# Patient Record
Sex: FEMALE | Race: WHITE | NOT HISPANIC OR LATINO | Employment: OTHER | URBAN - METROPOLITAN AREA
[De-identification: names, ages, dates, MRNs, and addresses within clinical notes are randomized per-mention and may not be internally consistent; named-entity substitution may affect disease eponyms.]

---

## 2023-09-09 PROBLEM — R55 VASOVAGAL SYNCOPE: Status: ACTIVE | Noted: 2023-09-09

## 2023-09-09 PROBLEM — Z86.69 HX OF GUILLAIN-BARRE SYNDROME: Status: ACTIVE | Noted: 2023-09-09

## 2023-09-09 PROBLEM — E78.5 HYPERLIPIDEMIA: Status: ACTIVE | Noted: 2023-09-09

## 2023-09-09 PROBLEM — J40 BRONCHITIS: Status: ACTIVE | Noted: 2023-09-09

## 2023-09-09 PROBLEM — K21.9 ACID REFLUX: Status: ACTIVE | Noted: 2023-09-09

## 2023-09-09 PROBLEM — E03.9 HYPOTHYROIDISM: Status: ACTIVE | Noted: 2023-09-09

## 2023-09-09 PROBLEM — N95.1 MENOPAUSAL SYMPTOM: Status: ACTIVE | Noted: 2023-09-09

## 2023-09-09 PROBLEM — M25.511 SHOULDER PAIN, RIGHT: Status: ACTIVE | Noted: 2023-09-09

## 2023-09-09 PROBLEM — L20.9 ATOPIC DERMATITIS: Status: ACTIVE | Noted: 2023-09-09

## 2023-09-09 PROBLEM — R35.0 URINE FREQUENCY: Status: ACTIVE | Noted: 2023-09-09

## 2023-09-09 PROBLEM — R82.998 LEUKOCYTES IN URINE: Status: ACTIVE | Noted: 2023-09-09

## 2023-09-09 PROBLEM — G43.909 MIGRAINE: Status: ACTIVE | Noted: 2023-09-09

## 2023-09-09 PROBLEM — M43.6 TORTICOLLIS: Status: ACTIVE | Noted: 2023-09-09

## 2023-09-09 PROBLEM — Z86.018 HISTORY OF PITUITARY ADENOMA: Status: ACTIVE | Noted: 2023-09-09

## 2023-09-09 PROBLEM — M19.90 OSTEOARTHRITIS: Status: ACTIVE | Noted: 2023-09-09

## 2023-09-09 PROBLEM — K64.9 HEMORRHOIDS: Status: ACTIVE | Noted: 2023-09-09

## 2023-09-09 PROBLEM — R73.09 ELEVATED GLUCOSE: Status: ACTIVE | Noted: 2023-09-09

## 2023-09-09 RX ORDER — BETAMETHASONE DIPROPIONATE 0.5 MG/G
CREAM TOPICAL
COMMUNITY
Start: 2014-10-22

## 2023-09-09 RX ORDER — LEVOTHYROXINE SODIUM 112 UG/1
TABLET ORAL
COMMUNITY
Start: 2017-07-28 | End: 2024-04-09 | Stop reason: ALTCHOICE

## 2023-09-09 RX ORDER — BENZONATATE 100 MG/1
CAPSULE ORAL
COMMUNITY
Start: 2021-11-01 | End: 2024-04-09 | Stop reason: WASHOUT

## 2023-09-09 RX ORDER — OMEPRAZOLE 20 MG/1
CAPSULE, DELAYED RELEASE ORAL
COMMUNITY

## 2023-09-09 RX ORDER — LEVOTHYROXINE SODIUM 125 UG/1
TABLET ORAL
COMMUNITY
Start: 2021-11-21 | End: 2024-04-10 | Stop reason: SDUPTHER

## 2023-09-18 ENCOUNTER — HOSPITAL ENCOUNTER (OUTPATIENT)
Dept: DATA CONVERSION | Facility: HOSPITAL | Age: 73
Discharge: HOME | End: 2023-09-18
Payer: MEDICARE

## 2023-09-18 DIAGNOSIS — M48.062 SPINAL STENOSIS, LUMBAR REGION WITH NEUROGENIC CLAUDICATION: ICD-10-CM

## 2023-10-02 ENCOUNTER — HOSPITAL ENCOUNTER (OUTPATIENT)
Dept: RADIOLOGY | Facility: EXTERNAL LOCATION | Age: 73
Discharge: HOME | End: 2023-10-02
Payer: MEDICARE

## 2023-10-02 DIAGNOSIS — M19.071 ARTHRITIS OF RIGHT ANKLE: ICD-10-CM

## 2024-04-09 ENCOUNTER — OFFICE VISIT (OUTPATIENT)
Dept: PRIMARY CARE | Facility: CLINIC | Age: 74
End: 2024-04-09
Payer: MEDICARE

## 2024-04-09 ENCOUNTER — LAB (OUTPATIENT)
Dept: LAB | Facility: LAB | Age: 74
End: 2024-04-09
Payer: MEDICARE

## 2024-04-09 VITALS
BODY MASS INDEX: 35.44 KG/M2 | HEIGHT: 63 IN | WEIGHT: 200 LBS | TEMPERATURE: 96.4 F | OXYGEN SATURATION: 94 % | DIASTOLIC BLOOD PRESSURE: 88 MMHG | SYSTOLIC BLOOD PRESSURE: 138 MMHG | HEART RATE: 66 BPM

## 2024-04-09 DIAGNOSIS — Z01.89 ENCOUNTER FOR ROUTINE LABORATORY TESTING: ICD-10-CM

## 2024-04-09 DIAGNOSIS — Z00.00 ENCOUNTER FOR MEDICARE ANNUAL WELLNESS EXAM: Primary | ICD-10-CM

## 2024-04-09 DIAGNOSIS — K21.9 GASTROESOPHAGEAL REFLUX DISEASE WITHOUT ESOPHAGITIS: ICD-10-CM

## 2024-04-09 DIAGNOSIS — E03.9 ACQUIRED HYPOTHYROIDISM: ICD-10-CM

## 2024-04-09 DIAGNOSIS — R53.83 FATIGUE, UNSPECIFIED TYPE: ICD-10-CM

## 2024-04-09 DIAGNOSIS — Z78.0 MENOPAUSE: ICD-10-CM

## 2024-04-09 DIAGNOSIS — R73.09 ELEVATED GLUCOSE: ICD-10-CM

## 2024-04-09 DIAGNOSIS — Z11.59 NEED FOR HEPATITIS C SCREENING TEST: ICD-10-CM

## 2024-04-09 DIAGNOSIS — E78.2 MIXED HYPERLIPIDEMIA: ICD-10-CM

## 2024-04-09 DIAGNOSIS — Z13.820 ENCOUNTER FOR SCREENING FOR OSTEOPOROSIS: ICD-10-CM

## 2024-04-09 PROBLEM — M48.061 SPINAL STENOSIS OF LUMBAR REGION: Status: ACTIVE | Noted: 2023-09-14

## 2024-04-09 PROBLEM — T14.8XXA CONTUSION: Status: RESOLVED | Noted: 2024-04-09 | Resolved: 2024-04-09

## 2024-04-09 PROBLEM — E66.9 OBESITY: Status: ACTIVE | Noted: 2024-04-09

## 2024-04-09 PROBLEM — M16.12 UNILATERAL PRIMARY OSTEOARTHRITIS, LEFT HIP: Status: ACTIVE | Noted: 2024-04-09

## 2024-04-09 PROBLEM — M47.812 CERVICAL SPONDYLOSIS WITHOUT MYELOPATHY: Status: ACTIVE | Noted: 2023-04-17

## 2024-04-09 PROBLEM — R05.9 COUGH: Status: RESOLVED | Noted: 2023-05-12 | Resolved: 2024-04-09

## 2024-04-09 PROBLEM — N39.0 ACUTE URINARY TRACT INFECTION: Status: RESOLVED | Noted: 2023-05-12 | Resolved: 2024-04-09

## 2024-04-09 PROBLEM — G61.0: Status: RESOLVED | Noted: 2024-04-09 | Resolved: 2024-04-09

## 2024-04-09 PROBLEM — K85.90 PANCREATITIS (HHS-HCC): Status: RESOLVED | Noted: 2024-04-09 | Resolved: 2024-04-09

## 2024-04-09 PROBLEM — M54.12 CERVICAL RADICULOPATHY: Status: ACTIVE | Noted: 2023-04-07

## 2024-04-09 PROBLEM — M19.90 ARTHRITIS: Status: ACTIVE | Noted: 2024-04-09

## 2024-04-09 PROBLEM — H66.90 ACUTE OTITIS MEDIA: Status: RESOLVED | Noted: 2024-04-09 | Resolved: 2024-04-09

## 2024-04-09 PROBLEM — K25.9 GASTRIC ULCER: Status: RESOLVED | Noted: 2024-04-09 | Resolved: 2024-04-09

## 2024-04-09 LAB
ALBUMIN SERPL-MCNC: 4.5 G/DL (ref 3.5–5)
ALP BLD-CCNC: 110 U/L (ref 35–125)
ALT SERPL-CCNC: 9 U/L (ref 5–40)
ANION GAP SERPL CALC-SCNC: 15 MMOL/L
AST SERPL-CCNC: 17 U/L (ref 5–40)
BASOPHILS # BLD AUTO: 0.05 X10*3/UL (ref 0–0.1)
BASOPHILS NFR BLD AUTO: 0.5 %
BILIRUB SERPL-MCNC: 0.5 MG/DL (ref 0.1–1.2)
BUN SERPL-MCNC: 18 MG/DL (ref 8–25)
CALCIUM SERPL-MCNC: 10.1 MG/DL (ref 8.5–10.4)
CHLORIDE SERPL-SCNC: 103 MMOL/L (ref 97–107)
CHOLEST SERPL-MCNC: 236 MG/DL (ref 133–200)
CHOLEST/HDLC SERPL: 5 {RATIO}
CO2 SERPL-SCNC: 25 MMOL/L (ref 24–31)
CREAT SERPL-MCNC: 0.9 MG/DL (ref 0.4–1.6)
EGFRCR SERPLBLD CKD-EPI 2021: 67 ML/MIN/1.73M*2
EOSINOPHIL # BLD AUTO: 0.32 X10*3/UL (ref 0–0.4)
EOSINOPHIL NFR BLD AUTO: 3 %
ERYTHROCYTE [DISTWIDTH] IN BLOOD BY AUTOMATED COUNT: 14.2 % (ref 11.5–14.5)
EST. AVERAGE GLUCOSE BLD GHB EST-MCNC: 131 MG/DL
GLUCOSE SERPL-MCNC: 97 MG/DL (ref 65–99)
HBA1C MFR BLD: 6.2 %
HCT VFR BLD AUTO: 42.9 % (ref 36–46)
HCV AB SER QL: NONREACTIVE
HDLC SERPL-MCNC: 47 MG/DL
HGB BLD-MCNC: 13 G/DL (ref 12–16)
IMM GRANULOCYTES # BLD AUTO: 0.04 X10*3/UL (ref 0–0.5)
IMM GRANULOCYTES NFR BLD AUTO: 0.4 % (ref 0–0.9)
LDLC SERPL CALC-MCNC: 162 MG/DL (ref 65–130)
LYMPHOCYTES # BLD AUTO: 3.73 X10*3/UL (ref 0.8–3)
LYMPHOCYTES NFR BLD AUTO: 34.8 %
MCH RBC QN AUTO: 26.8 PG (ref 26–34)
MCHC RBC AUTO-ENTMCNC: 30.3 G/DL (ref 32–36)
MCV RBC AUTO: 89 FL (ref 80–100)
MONOCYTES # BLD AUTO: 0.52 X10*3/UL (ref 0.05–0.8)
MONOCYTES NFR BLD AUTO: 4.9 %
NEUTROPHILS # BLD AUTO: 6.06 X10*3/UL (ref 1.6–5.5)
NEUTROPHILS NFR BLD AUTO: 56.4 %
NRBC BLD-RTO: 0 /100 WBCS (ref 0–0)
PLATELET # BLD AUTO: 329 X10*3/UL (ref 150–450)
POTASSIUM SERPL-SCNC: 4.7 MMOL/L (ref 3.4–5.1)
PROT SERPL-MCNC: 7.8 G/DL (ref 5.9–7.9)
RBC # BLD AUTO: 4.85 X10*6/UL (ref 4–5.2)
SODIUM SERPL-SCNC: 143 MMOL/L (ref 133–145)
T4 FREE SERPL-MCNC: 1.4 NG/DL (ref 0.9–1.7)
TRIGL SERPL-MCNC: 137 MG/DL (ref 40–150)
TSH SERPL DL<=0.05 MIU/L-ACNC: 5.37 MIU/L (ref 0.27–4.2)
WBC # BLD AUTO: 10.7 X10*3/UL (ref 4.4–11.3)

## 2024-04-09 PROCEDURE — 1036F TOBACCO NON-USER: CPT | Performed by: NURSE PRACTITIONER

## 2024-04-09 PROCEDURE — 83036 HEMOGLOBIN GLYCOSYLATED A1C: CPT

## 2024-04-09 PROCEDURE — 84439 ASSAY OF FREE THYROXINE: CPT

## 2024-04-09 PROCEDURE — 85025 COMPLETE CBC W/AUTO DIFF WBC: CPT

## 2024-04-09 PROCEDURE — 84443 ASSAY THYROID STIM HORMONE: CPT

## 2024-04-09 PROCEDURE — 1126F AMNT PAIN NOTED NONE PRSNT: CPT | Performed by: NURSE PRACTITIONER

## 2024-04-09 PROCEDURE — G0439 PPPS, SUBSEQ VISIT: HCPCS | Performed by: NURSE PRACTITIONER

## 2024-04-09 PROCEDURE — 1159F MED LIST DOCD IN RCRD: CPT | Performed by: NURSE PRACTITIONER

## 2024-04-09 PROCEDURE — 80053 COMPREHEN METABOLIC PANEL: CPT

## 2024-04-09 PROCEDURE — 80061 LIPID PANEL: CPT

## 2024-04-09 PROCEDURE — 86803 HEPATITIS C AB TEST: CPT

## 2024-04-09 PROCEDURE — 99215 OFFICE O/P EST HI 40 MIN: CPT | Performed by: NURSE PRACTITIONER

## 2024-04-09 PROCEDURE — 36415 COLL VENOUS BLD VENIPUNCTURE: CPT

## 2024-04-09 ASSESSMENT — PATIENT HEALTH QUESTIONNAIRE - PHQ9
SUM OF ALL RESPONSES TO PHQ9 QUESTIONS 1 AND 2: 0
2. FEELING DOWN, DEPRESSED OR HOPELESS: NOT AT ALL
1. LITTLE INTEREST OR PLEASURE IN DOING THINGS: NOT AT ALL

## 2024-04-09 ASSESSMENT — ENCOUNTER SYMPTOMS
PALPITATIONS: 0
SPEECH DIFFICULTY: 0
COLOR CHANGE: 0
NECK PAIN: 1
AGITATION: 0
DYSURIA: 0
POLYPHAGIA: 0
FACIAL ASYMMETRY: 0
DIAPHORESIS: 0
FEVER: 0
FATIGUE: 0
ABDOMINAL PAIN: 0
DIZZINESS: 0
BLOOD IN STOOL: 0
NAUSEA: 0
CONFUSION: 0
COUGH: 0
HEMATURIA: 0
SEIZURES: 0
VOMITING: 0
SHORTNESS OF BREATH: 0
ADENOPATHY: 0
BACK PAIN: 1
POLYDIPSIA: 0
BRUISES/BLEEDS EASILY: 0
CHEST TIGHTNESS: 0
CHILLS: 0
FLANK PAIN: 0
HEADACHES: 0

## 2024-04-09 ASSESSMENT — PAIN SCALES - GENERAL: PAINLEVEL: 0-NO PAIN

## 2024-04-09 NOTE — PROGRESS NOTES
[facility]: [facility]  MEDICARE WELLNESS EXAM      Gauri Sharpe is a 73 y.o. female that is presenting today for Annual Medicare Wellness Exam.    Assessment/Plan    Diagnoses and all orders for this visit:  Encounter for Medicare annual wellness exam    Gastroesophageal reflux disease without esophagitis        -     Good control        -     Continue Omeprazole 20 mg daily    Acquired hypothyroidism  -     Clinically euthyroid  -     TSH with reflex to Free T4 if abnormal; Future  -     Continue Levothyroxine 125 mcg daily except Sunday    Need for hepatitis C screening test  -     Hepatitis C antibody; Future    Elevated glucose  -     Comprehensive Metabolic Panel; Future  -     Hemoglobin A1C; Future    Mixed hyperlipidemia  -     Comprehensive Metabolic Panel; Future  -     Lipid Panel; Future    Encounter for routine laboratory testing  -     CBC and Auto Differential; Future  -     Comprehensive Metabolic Panel; Future  -     Lipid Panel; Future  -     Hemoglobin A1C; Future  -     TSH with reflex to Free T4 if abnormal; Future    Encounter for screening for osteoporosis  -     XR DEXA bone density; Future    Fatigue, unspecified type  -     CBC and Auto Differential; Future  -     Comprehensive Metabolic Panel; Future    Menopause  -     XR DEXA bone density; Future    Other orders  -     Follow Up In Primary Care - Medicare Annual; Future    ADVANCED CARE PLANNING  Advanced Care Planning was discussed with patient:  The patient has an active advanced care plan on file. The patient has an active surrogate decision-maker on file.  Encouraged the patient to confirm that Living Will and Healthcare Power of  (HCPoA) are accurate and up to date.  Encouraged the patient to confirm that our office be provided a copy of any documentation in the event that anything changes.    ACTIVITIES OF DAILY LIVING  Basic ADLs:  Bathing: Independent, Dressing: Independent, Toileting:  Independent, Transferring: Independent, Continence: Independent, Feeding: Independent.    Instrumental ADLs:  Ability to use phone: Independent, Shopping: Independent, Cooking: Independent, House-keeping: Independent, Laundry: Independent, Transportation: Independent, Medication Management: Independent, Finance Management: Independent.    Jami Sharpe is a 73 y.o. female who presents for follow up of hypothyroidism. Current symptoms: none. Patient denies change in energy level, diarrhea, heat / cold intolerance, nervousness, palpitations, and weight changes. Symptoms have stabilized and been well-controlled.    [unfilled]     Objective  [unfilled]    Laboratory:  Lab Results       Component                Value               Date                       TSH                      3.53                04/04/2023              Assessment/Plan  Hypothyroidism.  Replacement is Levothyroxine 125 mcg daily except Sunday    1. L-thyroxine per orders.  2. Recheck thyroid function tests today.  3. Instructed not to take multivitamins or iron within 4 hours of taking thyroid medications.  4. Follow up in 1 year.    Jami Sharpe is an 73 y.o. female who presents for evaluation of heartburn.  She denies cough, dysphagia, heartburn, hematemesis, hoarseness, midespigastric pain, and nausea.  She denies dysphagia. She has not lost weight. She denies melena, hematochezia, hematemesis, and coffee ground emesis.     Objective  [unfilled]     Assessment/Plan  Gastroesophageal Reflux Disease,  stable  Nonpharmacologic treatments were discussed including: eating smaller meals, elevation of the head of bed at night, avoidance of caffeine, chocolate, nicotine and peppermint, and avoiding tight fitting clothing.  Will continue Omeprazole 20 mg daily  Follow up in 1 year or sooner as needed.            Review of Systems   Constitutional:  Negative for chills, diaphoresis, fatigue and fever.   HENT:   Negative for hearing loss and mouth sores.    Eyes:  Negative for visual disturbance.   Respiratory:  Negative for cough, chest tightness and shortness of breath.    Cardiovascular:  Negative for chest pain, palpitations and leg swelling.   Gastrointestinal:  Negative for abdominal pain, blood in stool, nausea and vomiting.   Endocrine: Negative for cold intolerance, heat intolerance, polydipsia, polyphagia and polyuria.   Genitourinary:  Negative for dysuria, flank pain and hematuria.   Musculoskeletal:  Positive for back pain (Chronic LBP) and neck pain (chronic).   Skin:  Negative for color change and pallor.   Allergic/Immunologic: Negative for environmental allergies, food allergies and immunocompromised state.   Neurological:  Negative for dizziness, seizures, syncope, facial asymmetry, speech difficulty and headaches.   Hematological:  Negative for adenopathy. Does not bruise/bleed easily.   Psychiatric/Behavioral:  Negative for agitation and confusion.      Objective   Vitals:    04/09/24 0954   BP: 138/88   Pulse: 66   Temp: 35.8 °C (96.4 °F)   SpO2: 94%      Body mass index is 35.71 kg/m².  Physical Exam  Vitals and nursing note reviewed.   Constitutional:       General: She is not in acute distress.     Appearance: Normal appearance. She is not ill-appearing.   HENT:      Head: Normocephalic and atraumatic.      Right Ear: Tympanic membrane, ear canal and external ear normal. There is no impacted cerumen.      Left Ear: Tympanic membrane, ear canal and external ear normal. There is no impacted cerumen.      Nose: Nose normal.      Mouth/Throat:      Mouth: Mucous membranes are moist.      Pharynx: Oropharynx is clear. No oropharyngeal exudate or posterior oropharyngeal erythema.   Eyes:      General: No scleral icterus.        Right eye: No discharge.         Left eye: No discharge.      Extraocular Movements: Extraocular movements intact.      Conjunctiva/sclera: Conjunctivae normal.      Pupils: Pupils  are equal, round, and reactive to light.   Neck:      Vascular: No carotid bruit.   Cardiovascular:      Rate and Rhythm: Normal rate and regular rhythm.      Pulses: Normal pulses.      Heart sounds: Normal heart sounds. No murmur heard.  Pulmonary:      Effort: Pulmonary effort is normal. No respiratory distress.      Breath sounds: Normal breath sounds.   Abdominal:      General: Abdomen is flat. Bowel sounds are normal. There is no distension.      Palpations: Abdomen is soft. There is no mass.      Tenderness: There is no abdominal tenderness. There is no right CVA tenderness or left CVA tenderness.      Hernia: No hernia is present.   Musculoskeletal:         General: No tenderness. Normal range of motion.      Cervical back: No tenderness.      Right lower leg: No edema.      Left lower leg: No edema.   Lymphadenopathy:      Cervical: No cervical adenopathy.   Skin:     General: Skin is warm and dry.      Coloration: Skin is not jaundiced.      Findings: No rash.   Neurological:      General: No focal deficit present.      Mental Status: She is alert and oriented to person, place, and time. Mental status is at baseline.   Psychiatric:         Mood and Affect: Mood normal.         Behavior: Behavior normal.       Diagnostic Results   Lab Results   Component Value Date    GLUCOSE 103 (H) 04/04/2023    CALCIUM 9.6 04/04/2023     04/04/2023    K 4.4 04/04/2023    CO2 26 04/04/2023     04/04/2023    BUN 18 04/04/2023    CREATININE 0.9 04/04/2023     Lab Results   Component Value Date    ALT 10 11/10/2021    AST 17 11/10/2021    ALKPHOS 77 11/10/2021    BILITOT 0.6 11/10/2021     Lab Results   Component Value Date    WBC 10.3 04/04/2023    HGB 13.1 04/04/2023    HCT 41.2 04/04/2023    MCV 89.8 04/04/2023     04/04/2023     Lab Results   Component Value Date    CHOL 213 (H) 04/04/2023    CHOL 207 (H) 03/31/2022    CHOL 216 (H) 11/10/2021     Lab Results   Component Value Date    HDL 43 (L)  "04/04/2023    HDL 45 (L) 03/31/2022    HDL 44.3 11/10/2021     Lab Results   Component Value Date    LDLCALC 142 (H) 04/04/2023    LDLCALC 136 (H) 03/31/2022     Lab Results   Component Value Date    TRIG 139 04/04/2023    TRIG 129 03/31/2022    TRIG 172 (H) 11/10/2021     No components found for: \"CHOLHDL\"  No results found for: \"HGBA1C\"  Other labs not included in the list above reviewed either before or during this encounter.    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    Acute maxillary sinusitis, unspecified 04/01/2019    Acute maxillary sinusitis    Acute sinusitis, unspecified 07/20/2017    Acute rhinosinusitis    Acute upper respiratory infection, unspecified 10/05/2015    Acute URI    Acute urinary tract infection 05/12/2023    Asymptomatic menopausal state     Menopause    Contusion 04/09/2024    Contusion of other part of head, initial encounter 03/28/2016    Contusion of face    Contusion of unspecified front wall of thorax, initial encounter 03/28/2016    Contusion of ribs    Contusion of unspecified front wall of thorax, initial encounter 03/28/2016    Contusion, chest wall    Cough 05/12/2023    Disease of intestine, unspecified 07/20/2017    Colon abnormality    Gastric ulcer 04/09/2024    1972 1972    Hyperlipidemia, unspecified 08/30/2020    Hyperlipidemia    Lateral epicondylitis 06/27/2010    Mucoid cyst, joint 01/17/2012    Other specified cough 10/15/2017    Productive cough    Pain in limb 03/03/2009    Pancreatitis 04/09/2024    Personal history of other diseases of the digestive system 07/18/2014    History of irritable bowel syndrome    Personal history of other diseases of the nervous system and sense organs     History of " Guillain-[place] syndrome    Personal history of other diseases of the nervous system and sense organs     History of migraine    Personal history of other diseases of the nervous system and sense organs 01/21/2019    History of acute otitis media    Personal history of other diseases of the respiratory system 03/28/2018    History of bronchitis    Personal history of other diseases of the respiratory system 09/07/2016    History of acute bacterial sinusitis    Personal history of other endocrine, nutritional and metabolic disease     History of hypothyroidism    Personal history of other specified conditions     History of pituitary tumor    Unspecified acute conjunctivitis, bilateral 04/01/2019    Acute bacterial conjunctivitis of both eyes    Unspecified fall, initial encounter 03/28/2016    Fall at home    Unspecified multiple injuries, initial encounter 03/28/2016    Multiple contusions     Past Surgical History:   Procedure Laterality Date    CHOLECYSTECTOMY  03/24/2014    Cholecystectomy    COLONOSCOPY  03/24/2014    Complete Colonoscopy    OTHER SURGICAL HISTORY  03/24/2014    Neuroendosc Dissect Adhesion Excise Pituitary Tumor    OTHER SURGICAL HISTORY  03/31/2015    Gastric Surgery For Morbid Obesity Laparoscopic Longitudinal Gastrectomy    TONSILLECTOMY  03/24/2014    Tonsillectomy    TOTAL HIP ARTHROPLASTY Left 11/29/2023    TOTAL KNEE ARTHROPLASTY  03/31/2015    Knee Replacement     Family History   Problem Relation Name Age of Onset    Kidney disease Mother      Stroke Mother      Diabetes Mother      Alzheimer's disease Father      Diabetes Father      Alzheimer's disease Sister      Diabetes Sister      Diabetes Brother      Diabetes Brother      Down syndrome Brother      Alzheimer's disease Brother       Social History     Socioeconomic History    Marital status: Single     Spouse name: Not on file    Number of children: Not on file    Years of education: Not on file    Highest education level: Not  "on file   Occupational History    Not on file   Tobacco Use    Smoking status: Never    Smokeless tobacco: Never   Vaping Use    Vaping Use: Never used   Substance and Sexual Activity    Alcohol use: Not Currently    Drug use: Never    Sexual activity: Not on file   Other Topics Concern    Not on file   Social History Narrative    Not on file     Social Determinants of Health     Financial Resource Strain: Not on file   Food Insecurity: Not on file   Transportation Needs: Not on file   Physical Activity: Not on file   Stress: Not on file   Social Connections: Not on file   Intimate Partner Violence: Not on file   Housing Stability: Not on file     Allergies   Allergen Reactions    Influenza Virus Vaccines Other     \"Guillain-[place] syndrome    Adhesive Tape-Silicones Rash     RASH TO SKIN     Current Outpatient Medications on File Prior to Visit   Medication Sig Dispense Refill    betamethasone dipropionate 0.05 % cream 0 cream      levothyroxine (Synthroid, Levoxyl) 125 mcg tablet 1 tablet in the morning on an empty stomach Orally Once a day except Sundays      omeprazole (PriLOSEC) 20 mg DR capsule 1 capsule 30 minutes before morning meal Orally Once a day for 30 day(s)      [DISCONTINUED] levothyroxine (Synthroid, Levoxyl) 112 mcg tablet Take ONE TABLET EVERY OTHER DAY ALTERNATING WITH 125mcg      [DISCONTINUED] benzonatate (Tessalon) 100 mg capsule TAKE 1 CAPSULE 3 TIMES DAILY AS NEEDED.       No current facility-administered medications on file prior to visit.     Immunization History   Administered Date(s) Administered    Influenza, seasonal, injectable 10/28/2017    Pneumococcal polysaccharide vaccine, 23-valent, age 2 years and older (PNEUMOVAX 23) 03/28/2015    Td (adult) 08/01/1999     Patient's medical history was reviewed and updated either before or during this encounter.     Danelle Giordano, APRN-CNP  "

## 2024-04-10 DIAGNOSIS — E03.9 ACQUIRED HYPOTHYROIDISM: Primary | ICD-10-CM

## 2024-04-10 DIAGNOSIS — Z01.89 ENCOUNTER FOR ROUTINE LABORATORY TESTING: ICD-10-CM

## 2024-04-10 RX ORDER — LEVOTHYROXINE SODIUM 125 UG/1
125 TABLET ORAL
Qty: 90 TABLET | Refills: 1 | Status: SHIPPED | OUTPATIENT
Start: 2024-04-10

## 2024-04-10 NOTE — RESULT ENCOUNTER NOTE
Cholesterol elevations. ASCVD Risk is high at 18.6%. I recommend starting a statin medication. If agreeable, I will call in Rx. I also recommend initiating lifestyle modification efforts to include low cholesterol diet, weight management and regularly engage in aerobic activity.  A1c is mildly elevated. Encourage previously noted lifestyle modification efforts to include low sugar and carbohydrate diet. CBC no significant abnormality. Hepatitis C negative.  CMP WNL.  TSH elevated. Increase Levothyroxine to take daily - including Sundays. Repeat TSH in 3 months - order placed.

## 2024-04-11 NOTE — RESULT ENCOUNTER NOTE
Left voicemail for patient to call office regarding lab results, advised to ask for Danelle Giordano's nurse, informed that we are not in on Fridays.

## 2024-04-15 NOTE — RESULT ENCOUNTER NOTE
Pt informed of lab results and recommendations, pt does not wish to start statin pt states she will modify diet and exercise, states she had leg cramps when she tried statin in the past.  Pt will repeat TSH in 3 months.

## 2024-07-03 ENCOUNTER — OFFICE VISIT (OUTPATIENT)
Dept: PRIMARY CARE | Facility: CLINIC | Age: 74
End: 2024-07-03
Payer: MEDICARE

## 2024-07-03 VITALS
HEART RATE: 72 BPM | SYSTOLIC BLOOD PRESSURE: 116 MMHG | TEMPERATURE: 97.2 F | DIASTOLIC BLOOD PRESSURE: 78 MMHG | WEIGHT: 203 LBS | OXYGEN SATURATION: 96 % | BODY MASS INDEX: 36.25 KG/M2

## 2024-07-03 DIAGNOSIS — R82.90 ABNORMAL URINALYSIS: ICD-10-CM

## 2024-07-03 DIAGNOSIS — R39.15 URINARY URGENCY: Primary | ICD-10-CM

## 2024-07-03 LAB
POC APPEARANCE, URINE: CLEAR
POC BILIRUBIN, URINE: NEGATIVE
POC BLOOD, URINE: ABNORMAL
POC COLOR, URINE: YELLOW
POC GLUCOSE, URINE: NEGATIVE MG/DL
POC KETONES, URINE: NEGATIVE MG/DL
POC LEUKOCYTES, URINE: ABNORMAL
POC NITRITE,URINE: POSITIVE
POC PH, URINE: 5 PH
POC PROTEIN, URINE: ABNORMAL MG/DL
POC SPECIFIC GRAVITY, URINE: 1.01
POC UROBILINOGEN, URINE: 0.2 EU/DL

## 2024-07-03 PROCEDURE — 87086 URINE CULTURE/COLONY COUNT: CPT | Mod: WESLAB | Performed by: NURSE PRACTITIONER

## 2024-07-03 PROCEDURE — 99213 OFFICE O/P EST LOW 20 MIN: CPT | Performed by: NURSE PRACTITIONER

## 2024-07-03 PROCEDURE — 1159F MED LIST DOCD IN RCRD: CPT | Performed by: NURSE PRACTITIONER

## 2024-07-03 PROCEDURE — 81002 URINALYSIS NONAUTO W/O SCOPE: CPT | Performed by: NURSE PRACTITIONER

## 2024-07-03 PROCEDURE — 1036F TOBACCO NON-USER: CPT | Performed by: NURSE PRACTITIONER

## 2024-07-03 PROCEDURE — 1160F RVW MEDS BY RX/DR IN RCRD: CPT | Performed by: NURSE PRACTITIONER

## 2024-07-03 RX ORDER — NITROFURANTOIN 25; 75 MG/1; MG/1
100 CAPSULE ORAL 2 TIMES DAILY
Qty: 14 CAPSULE | Refills: 0 | Status: SHIPPED | OUTPATIENT
Start: 2024-07-03 | End: 2024-07-10

## 2024-07-03 ASSESSMENT — COLUMBIA-SUICIDE SEVERITY RATING SCALE - C-SSRS
6. HAVE YOU EVER DONE ANYTHING, STARTED TO DO ANYTHING, OR PREPARED TO DO ANYTHING TO END YOUR LIFE?: NO
2. HAVE YOU ACTUALLY HAD ANY THOUGHTS OF KILLING YOURSELF?: NO
1. IN THE PAST MONTH, HAVE YOU WISHED YOU WERE DEAD OR WISHED YOU COULD GO TO SLEEP AND NOT WAKE UP?: NO

## 2024-07-03 ASSESSMENT — ENCOUNTER SYMPTOMS
BACK PAIN: 1
DIFFICULTY URINATING: 1
COUGH: 0
FREQUENCY: 1
FEVER: 0
NAUSEA: 0
DYSURIA: 0
FLANK PAIN: 0
CHILLS: 0
HEMATURIA: 0
VOMITING: 0
SHORTNESS OF BREATH: 0
ABDOMINAL PAIN: 0

## 2024-07-03 ASSESSMENT — PATIENT HEALTH QUESTIONNAIRE - PHQ9
2. FEELING DOWN, DEPRESSED OR HOPELESS: NOT AT ALL
1. LITTLE INTEREST OR PLEASURE IN DOING THINGS: NOT AT ALL
SUM OF ALL RESPONSES TO PHQ9 QUESTIONS 1 AND 2: 0

## 2024-07-03 NOTE — PATIENT INSTRUCTIONS
DISCHARGE INSTRUCTIONS   -Discussed UTI and expected course of illness.   - Discussed antibiotic and possible side effects.   - Follow up with your in 2 days if no improvement.   - Return to clinic or go to urgent care sooner with concerns or worsening symptoms.   - If develops rash, shortness of breath or any other symptoms of allergy to medication- stop the medication and be seen immediately.      WHAT CARE IS NEEDED AT HOME  - To lower your chance of getting a UTI in the future, you can:  Drink extra fluids.  If you have sex, urinate right afterwards.  - Practice good hygiene. Wipe from front to back after going to the toilet.  - Do not use scented tampons, soap, or toilet paper.  - Keep your genital area clean. Wash daily with soap and water.  - Take showers instead of tub baths.  - Do not use douches or genital hygiene sprays.      CALL YOUR DOCTOR OR GO TO URGENT CARE IF:  Signs of worsening  infection. These include a fever of 100.4°F (38°C) or higher, chills, back pain, nausea, throwing up, or bloody urine.  Symptoms come back after treatment ends  You notice more blood in your urine.  Your symptoms get worse or do not improve within 24-48 hours of starting treatment.  You are not able to urinate for more than 8 hours.  Your symptoms  come back after treatment has stopped.   You develop abdominal pain, nausea, vomiting, worsening back pain or any worsening symptoms.

## 2024-07-03 NOTE — PROGRESS NOTES
"Subjective   Patient ID: Gauri Sharpe is a 73 y.o. female who presents for Urinary Frequency (urgency) and Back Pain.    HPI:  Complains of urinary urgency and frequency \"but hardly anything comes out\".   Some lower back pain that moves to lower abdomen.   No loss of bowel or bladder function. No known injury.   No nausea, vomiting, fever or chills.   No burning with urination.     Unsure if allergic to sulfa. Has family history. But does not think ever took bactrim.       Allergies   Allergen Reactions    Influenza Virus Vaccines Other     \"Guillain-[place] syndrome    Adhesive Tape-Silicones Rash     RASH TO SKIN       Current Outpatient Medications on File Prior to Visit   Medication Sig    betamethasone dipropionate 0.05 % cream 0 cream    levothyroxine (Synthroid, Levoxyl) 125 mcg tablet Take 1 tablet (125 mcg) by mouth once daily in the morning. Take before meals.    omeprazole (PriLOSEC) 20 mg DR capsule 1 capsule 30 minutes before morning meal Orally Once a day for 30 day(s)     No current facility-administered medications on file prior to visit.        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) 04/09/2024    Personal history of other diseases of the digestive system 07/18/2014    History of irritable bowel syndrome    Personal history of other diseases of the nervous system and sense organs     History of Guillain-[place] syndrome    Personal history of other " diseases of the nervous system and sense organs     History of migraine    Personal history of other endocrine, nutritional and metabolic disease     History of hypothyroidism    Personal history of other specified conditions     History of pituitary tumor    Unspecified fall, initial encounter 03/28/2016    Fall at home    Unspecified multiple injuries, initial encounter 03/28/2016    Multiple contusions       Past Surgical History:   Procedure Laterality Date    CHOLECYSTECTOMY  03/24/2014    Cholecystectomy    COLONOSCOPY  03/24/2014    Complete Colonoscopy    OTHER SURGICAL HISTORY  03/24/2014    Neuroendosc Dissect Adhesion Excise Pituitary Tumor    OTHER SURGICAL HISTORY  03/31/2015    Gastric Surgery For Morbid Obesity Laparoscopic Longitudinal Gastrectomy    TONSILLECTOMY  03/24/2014    Tonsillectomy    TOTAL HIP ARTHROPLASTY Left 11/29/2023    TOTAL KNEE ARTHROPLASTY  03/31/2015    Knee Replacement       Review of Systems   Constitutional:  Negative for chills and fever.   Respiratory:  Negative for cough and shortness of breath.    Cardiovascular:  Negative for chest pain.   Gastrointestinal:  Negative for abdominal pain, nausea and vomiting.   Genitourinary:  Positive for difficulty urinating, frequency and urgency. Negative for dysuria, flank pain, hematuria, pelvic pain and vaginal discharge.   Musculoskeletal:  Positive for back pain.       Objective   Visit Vitals  /78   Pulse 72   Temp 36.2 °C (97.2 °F)   Wt 92.1 kg (203 lb)   SpO2 96%   BMI 36.25 kg/m²   Smoking Status Never   BSA 2.02 m²       Office Visit on 07/03/2024   Component Date Value Ref Range Status    POC Color, Urine 07/03/2024 Yellow  Straw, Yellow, Light-Yellow Final    POC Appearance, Urine 07/03/2024 Clear  Clear Final    POC Glucose, Urine 07/03/2024 NEGATIVE  NEGATIVE mg/dl Final    POC Bilirubin, Urine 07/03/2024 NEGATIVE  NEGATIVE Final    POC Ketones, Urine 07/03/2024 NEGATIVE  NEGATIVE mg/dl Final    POC Specific  Gravity, Urine 07/03/2024 1.015  1.005 - 1.035 Final    POC Blood, Urine 07/03/2024 TRACE-Intact (A)  NEGATIVE Final    POC PH, Urine 07/03/2024 5.0  No Reference Range Established PH Final    POC Protein, Urine 07/03/2024 TRACE (A)  NEGATIVE, 30 (1+) mg/dl Final    POC Urobilinogen, Urine 07/03/2024 0.2  0.2, 1.0 EU/DL Final    Poc Nitrite, Urine 07/03/2024 POSITIVE (A)  NEGATIVE Final    POC Leukocytes, Urine 07/03/2024 TRACE (A)  NEGATIVE Final       Physical Exam  Constitutional:       General: She is not in acute distress.     Appearance: Normal appearance. She is not ill-appearing.   Pulmonary:      Effort: Pulmonary effort is normal.      Breath sounds: Normal breath sounds.   Abdominal:      General: Abdomen is flat. Bowel sounds are normal. There is no distension.      Palpations: Abdomen is soft.      Tenderness: There is no right CVA tenderness, left CVA tenderness, guarding or rebound.      Comments: Mild prepubic tenderness (over bladder), and very mild mid to RLQ ttp. No guarding or rebound tenderness    Neurological:      General: No focal deficit present.      Mental Status: She is alert.   Psychiatric:         Mood and Affect: Mood normal.         Assessment/Plan   Diagnoses and all orders for this visit:  Urinary urgency  -     Urine Culture  -     POCT UA (nonautomated) manually resulted  -     nitrofurantoin, macrocrystal-monohydrate, (Macrobid) 100 mg capsule; Take 1 capsule (100 mg) by mouth 2 times a day for 7 days.  Abnormal urinalysis  -     nitrofurantoin, macrocrystal-monohydrate, (Macrobid) 100 mg capsule; Take 1 capsule (100 mg) by mouth 2 times a day for 7 days.    - Will treat for UTI. Antibiotic sent to pharmacy and possible side effects/medication interactions discussed.   - Will send urine culture.   - Follow up in 2 days if no improvement.  - Will need to be seen sooner if develops: abdominal pain, nausea, vomiting, fevers, chills, increasing back pain or with any worsening  symptoms.

## 2024-07-06 LAB — BACTERIA UR CULT: ABNORMAL

## 2024-09-23 ENCOUNTER — LAB (OUTPATIENT)
Dept: LAB | Facility: LAB | Age: 74
End: 2024-09-23
Payer: MEDICARE

## 2024-09-23 ENCOUNTER — OFFICE VISIT (OUTPATIENT)
Dept: PRIMARY CARE | Facility: CLINIC | Age: 74
End: 2024-09-23
Payer: MEDICARE

## 2024-09-23 VITALS
BODY MASS INDEX: 38.28 KG/M2 | DIASTOLIC BLOOD PRESSURE: 80 MMHG | SYSTOLIC BLOOD PRESSURE: 128 MMHG | HEART RATE: 72 BPM | WEIGHT: 208 LBS | HEIGHT: 62 IN | TEMPERATURE: 97.1 F | OXYGEN SATURATION: 94 %

## 2024-09-23 DIAGNOSIS — R78.71 ABNORMAL LEAD LEVEL IN BLOOD: ICD-10-CM

## 2024-09-23 DIAGNOSIS — R63.1 INCREASED THIRST: ICD-10-CM

## 2024-09-23 DIAGNOSIS — R73.9 HYPERGLYCEMIA: Primary | ICD-10-CM

## 2024-09-23 DIAGNOSIS — R35.0 URINARY FREQUENCY: ICD-10-CM

## 2024-09-23 LAB
APPEARANCE UR: ABNORMAL
BACTERIA #/AREA URNS AUTO: ABNORMAL /HPF
BILIRUB UR STRIP.AUTO-MCNC: NEGATIVE MG/DL
COLOR UR: ABNORMAL
GLUCOSE UR STRIP.AUTO-MCNC: NORMAL MG/DL
KETONES UR STRIP.AUTO-MCNC: NEGATIVE MG/DL
LEUKOCYTE ESTERASE UR QL STRIP.AUTO: ABNORMAL
MUCOUS THREADS #/AREA URNS AUTO: ABNORMAL /LPF
NITRITE UR QL STRIP.AUTO: NEGATIVE
PH UR STRIP.AUTO: 5.5 [PH]
PROT UR STRIP.AUTO-MCNC: NEGATIVE MG/DL
RBC # UR STRIP.AUTO: NEGATIVE /UL
RBC #/AREA URNS AUTO: ABNORMAL /HPF
SP GR UR STRIP.AUTO: 1.02
SQUAMOUS #/AREA URNS AUTO: ABNORMAL /HPF
UROBILINOGEN UR STRIP.AUTO-MCNC: NORMAL MG/DL
WBC #/AREA URNS AUTO: ABNORMAL /HPF

## 2024-09-23 PROCEDURE — 99213 OFFICE O/P EST LOW 20 MIN: CPT | Performed by: LICENSED PRACTICAL NURSE

## 2024-09-23 PROCEDURE — 83935 ASSAY OF URINE OSMOLALITY: CPT

## 2024-09-23 PROCEDURE — 81001 URINALYSIS AUTO W/SCOPE: CPT

## 2024-09-23 PROCEDURE — 87086 URINE CULTURE/COLONY COUNT: CPT

## 2024-09-23 PROCEDURE — 1159F MED LIST DOCD IN RCRD: CPT | Performed by: LICENSED PRACTICAL NURSE

## 2024-09-23 PROCEDURE — 1126F AMNT PAIN NOTED NONE PRSNT: CPT | Performed by: LICENSED PRACTICAL NURSE

## 2024-09-23 PROCEDURE — 87186 SC STD MICRODIL/AGAR DIL: CPT

## 2024-09-23 PROCEDURE — 1036F TOBACCO NON-USER: CPT | Performed by: LICENSED PRACTICAL NURSE

## 2024-09-23 PROCEDURE — 83036 HEMOGLOBIN GLYCOSYLATED A1C: CPT

## 2024-09-23 PROCEDURE — 36415 COLL VENOUS BLD VENIPUNCTURE: CPT

## 2024-09-23 ASSESSMENT — ENCOUNTER SYMPTOMS
DYSURIA: 0
FREQUENCY: 1

## 2024-09-23 ASSESSMENT — PATIENT HEALTH QUESTIONNAIRE - PHQ9
1. LITTLE INTEREST OR PLEASURE IN DOING THINGS: NOT AT ALL
SUM OF ALL RESPONSES TO PHQ9 QUESTIONS 1 AND 2: 0
2. FEELING DOWN, DEPRESSED OR HOPELESS: NOT AT ALL

## 2024-09-23 ASSESSMENT — PAIN SCALES - GENERAL: PAINLEVEL: 0-NO PAIN

## 2024-09-23 NOTE — PROGRESS NOTES
[facility]: [facility]  PROGRESS NOTE      Gauri Sharpe is a 74 y.o. female that is presenting today for Follow-up (Pt states that she been having problems with her bladder /).      Subjective   Patient is a 74-year-old female who presents to the office today for concerns about bladder issues and increased thirst.  Ms. Sharpe has a past medical history of hyperlipidemia hypothyroidism, and osteoarthritis. She reports concerns of urinary frequency, urgency and increased thirst for several months. Ms. Sharpe denies  increased hunger, or burning up on urination.       Review of Systems   Genitourinary:  Positive for frequency and urgency. Negative for dysuria.      Objective   Vitals:    09/23/24 1302   BP: 128/80   Pulse: 72   Temp: 36.2 °C (97.1 °F)   SpO2: 94%      Body mass index is 37.53 kg/m².  Physical Exam  Vitals reviewed.   Constitutional:       General: She is not in acute distress.     Appearance: She is not ill-appearing, toxic-appearing or diaphoretic.   HENT:      Nose:      Comments: Lips are not dry and cracked.  Cardiovascular:      Rate and Rhythm: Normal rate and regular rhythm.   Pulmonary:      Effort: Pulmonary effort is normal. No respiratory distress.      Breath sounds: No stridor. No wheezing, rhonchi or rales.   Skin:     General: Skin is warm and dry.   Neurological:      Mental Status: She is alert.       Diagnostic Results   Lab Results   Component Value Date    GLUCOSE 97 04/09/2024    CALCIUM 10.1 04/09/2024     04/09/2024    K 4.7 04/09/2024    CO2 25 04/09/2024     04/09/2024    BUN 18 04/09/2024    CREATININE 0.90 04/09/2024     Lab Results   Component Value Date    ALT 9 04/09/2024    AST 17 04/09/2024    ALKPHOS 110 04/09/2024    BILITOT 0.5 04/09/2024     Lab Results   Component Value Date    WBC 10.7 04/09/2024    HGB 13.0 04/09/2024    HCT 42.9 04/09/2024    MCV 89 04/09/2024     04/09/2024     Lab Results   Component Value Date     "CHOL 236 (H) 04/09/2024    CHOL 213 (H) 04/04/2023    CHOL 207 (H) 03/31/2022     Lab Results   Component Value Date    HDL 47.0 (L) 04/09/2024    HDL 43 (L) 04/04/2023    HDL 45 (L) 03/31/2022     Lab Results   Component Value Date    LDLCALC 162 (H) 04/09/2024    LDLCALC 142 (H) 04/04/2023    LDLCALC 136 (H) 03/31/2022     Lab Results   Component Value Date    TRIG 137 04/09/2024    TRIG 139 04/04/2023    TRIG 129 03/31/2022     No components found for: \"CHOLHDL\"  Lab Results   Component Value Date    HGBA1C 6.2 (H) 04/09/2024     Other labs not included in the list above were reviewed either before or during this encounter.    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) 04/09/2024    Personal history of other diseases of the digestive system 07/18/2014    History of irritable bowel syndrome    Personal history of other diseases of the nervous system and sense organs     History of Guillain-[place] syndrome    Personal history of other diseases of the nervous system and sense organs     History of migraine    Personal history of other endocrine, nutritional and metabolic disease     History of hypothyroidism    Personal history of other specified conditions     History of pituitary tumor    Unspecified fall, initial encounter 03/28/2016    Fall at home    Unspecified multiple injuries, initial encounter 03/28/2016    Multiple contusions     Past Surgical History: " "  Procedure Laterality Date    CHOLECYSTECTOMY  03/24/2014    Cholecystectomy    COLONOSCOPY  03/24/2014    Complete Colonoscopy    OTHER SURGICAL HISTORY  03/24/2014    Neuroendosc Dissect Adhesion Excise Pituitary Tumor    OTHER SURGICAL HISTORY  03/31/2015    Gastric Surgery For Morbid Obesity Laparoscopic Longitudinal Gastrectomy    TONSILLECTOMY  03/24/2014    Tonsillectomy    TOTAL HIP ARTHROPLASTY Left 11/29/2023    TOTAL KNEE ARTHROPLASTY  03/31/2015    Knee Replacement     Family History   Problem Relation Name Age of Onset    Kidney disease Mother      Stroke Mother      Diabetes Mother      Alzheimer's disease Father      Diabetes Father      Alzheimer's disease Sister      Diabetes Sister      Diabetes Brother      Diabetes Brother      Down syndrome Brother      Alzheimer's disease Brother       Social History     Socioeconomic History    Marital status: Single     Spouse name: Not on file    Number of children: Not on file    Years of education: Not on file    Highest education level: Not on file   Occupational History    Not on file   Tobacco Use    Smoking status: Never    Smokeless tobacco: Never   Vaping Use    Vaping status: Never Used   Substance and Sexual Activity    Alcohol use: Not Currently    Drug use: Never    Sexual activity: Not on file   Other Topics Concern    Not on file   Social History Narrative    Not on file     Social Determinants of Health     Financial Resource Strain: Not on file   Food Insecurity: Not on file   Transportation Needs: Not on file   Physical Activity: Not on file   Stress: Not on file   Social Connections: Not on file   Intimate Partner Violence: Not on file   Housing Stability: Not on file     Allergies   Allergen Reactions    Influenza Virus Vaccines Other     \"Guillain-[place] syndrome    Adhesive Tape-Silicones Rash     RASH TO SKIN     Current Outpatient Medications on File Prior to Visit   Medication Sig Dispense Refill    betamethasone dipropionate 0.05 % " cream 0 cream      levothyroxine (Synthroid, Levoxyl) 125 mcg tablet Take 1 tablet (125 mcg) by mouth once daily in the morning. Take before meals. 90 tablet 1    omeprazole (PriLOSEC) 20 mg DR capsule 1 capsule 30 minutes before morning meal Orally Once a day for 30 day(s)       No current facility-administered medications on file prior to visit.     Immunization History   Administered Date(s) Administered    Influenza, seasonal, injectable 10/28/2017    Pneumococcal polysaccharide vaccine, 23-valent, age 2 years and older (PNEUMOVAX 23) 03/28/2015    Td (adult) 08/01/1999     Patient's medical history was reviewed and updated either before or during this encounter.       Assessment/Plan   Problem List Items Addressed This Visit    None  Visit Diagnoses       Hyperglycemia    -  Primary    Urinary frequency        Relevant Orders    Hemoglobin A1C    Urinalysis with Reflex Culture and Microscopic    Abnormal lead level in blood        Relevant Orders    Hemoglobin A1C    Increased thirst        Relevant Orders    Osmolality, urine          Ms. Sharpe's exam is benign.  Blood pressure 120/80 heart rate 72.  We discussed potential causes of increased thirst, urinary frequency urinary frequency, and urinary urgency, including diabetes and UTI, although I have low suspicion that she has a urinary tract infection and or diabetes.  I reviewed her last A1c, 6.2.  Because it is elevated I will reorder an A1c.  I will also order a urinalysis and urine osmolality.  I shared with her with her results are normal we will start her on Ditropan and she will follow-up a month.    Murtaza Cha, ALBERT-CNP

## 2024-09-24 LAB
EST. AVERAGE GLUCOSE BLD GHB EST-MCNC: 128 MG/DL
HBA1C MFR BLD: 6.1 %
HOLD SPECIMEN: NORMAL
OSMOLALITY UR: 755 MOSM/KG (ref 200–1200)

## 2024-09-25 NOTE — RESULT ENCOUNTER NOTE
Please inform Ms. Sharpe that her A1C has improved from 6.2 to 6.1. She does however have a UTI we are awaiting her culture to determine the correct treatment.

## 2024-09-26 DIAGNOSIS — N39.0 URINARY TRACT INFECTION WITHOUT HEMATURIA, SITE UNSPECIFIED: Primary | ICD-10-CM

## 2024-09-26 LAB — BACTERIA UR CULT: ABNORMAL

## 2024-09-26 RX ORDER — NITROFURANTOIN 25; 75 MG/1; MG/1
100 CAPSULE ORAL 2 TIMES DAILY
Qty: 14 CAPSULE | Refills: 0 | Status: SHIPPED | OUTPATIENT
Start: 2024-09-26 | End: 2024-10-03

## 2024-09-26 NOTE — RESULT ENCOUNTER NOTE
Please inform Ms. Sharpe that her urine culture shows Klebsiella growth. I have sent over a prescription for an antibiotic to her pharmacy

## 2024-10-04 PROBLEM — L25.9 UNSPECIFIED CONTACT DERMATITIS, UNSPECIFIED CAUSE: Status: RESOLVED | Noted: 2024-10-04 | Resolved: 2024-10-04

## 2024-10-14 ENCOUNTER — APPOINTMENT (OUTPATIENT)
Dept: PRIMARY CARE | Facility: CLINIC | Age: 74
End: 2024-10-14
Payer: MEDICARE

## 2024-10-15 ENCOUNTER — APPOINTMENT (OUTPATIENT)
Dept: PRIMARY CARE | Facility: CLINIC | Age: 74
End: 2024-10-15
Payer: MEDICARE

## 2024-10-16 ENCOUNTER — LAB (OUTPATIENT)
Dept: LAB | Facility: LAB | Age: 74
End: 2024-10-16
Payer: COMMERCIAL

## 2024-10-16 ENCOUNTER — OFFICE VISIT (OUTPATIENT)
Dept: PRIMARY CARE | Facility: CLINIC | Age: 74
End: 2024-10-16
Payer: MEDICARE

## 2024-10-16 VITALS
WEIGHT: 208 LBS | HEIGHT: 62 IN | DIASTOLIC BLOOD PRESSURE: 80 MMHG | HEART RATE: 68 BPM | OXYGEN SATURATION: 98 % | SYSTOLIC BLOOD PRESSURE: 122 MMHG | TEMPERATURE: 95.7 F | BODY MASS INDEX: 38.28 KG/M2

## 2024-10-16 DIAGNOSIS — R35.0 URINARY FREQUENCY: Primary | ICD-10-CM

## 2024-10-16 DIAGNOSIS — R35.0 URINARY FREQUENCY: ICD-10-CM

## 2024-10-16 LAB
APPEARANCE UR: CLEAR
BILIRUB UR STRIP.AUTO-MCNC: NEGATIVE MG/DL
COLOR UR: COLORLESS
GLUCOSE UR STRIP.AUTO-MCNC: NORMAL MG/DL
HOLD SPECIMEN: NORMAL
KETONES UR STRIP.AUTO-MCNC: NEGATIVE MG/DL
LEUKOCYTE ESTERASE UR QL STRIP.AUTO: NEGATIVE
NITRITE UR QL STRIP.AUTO: NEGATIVE
PH UR STRIP.AUTO: 6 [PH]
PROT UR STRIP.AUTO-MCNC: NEGATIVE MG/DL
RBC # UR STRIP.AUTO: NEGATIVE /UL
SP GR UR STRIP.AUTO: 1.01
UROBILINOGEN UR STRIP.AUTO-MCNC: NORMAL MG/DL

## 2024-10-16 PROCEDURE — 1123F ACP DISCUSS/DSCN MKR DOCD: CPT | Performed by: NURSE PRACTITIONER

## 2024-10-16 PROCEDURE — 1159F MED LIST DOCD IN RCRD: CPT | Performed by: NURSE PRACTITIONER

## 2024-10-16 PROCEDURE — 99214 OFFICE O/P EST MOD 30 MIN: CPT | Performed by: NURSE PRACTITIONER

## 2024-10-16 PROCEDURE — 1160F RVW MEDS BY RX/DR IN RCRD: CPT | Performed by: NURSE PRACTITIONER

## 2024-10-16 PROCEDURE — 81003 URINALYSIS AUTO W/O SCOPE: CPT

## 2024-10-16 PROCEDURE — 1036F TOBACCO NON-USER: CPT | Performed by: NURSE PRACTITIONER

## 2024-10-16 PROCEDURE — 1126F AMNT PAIN NOTED NONE PRSNT: CPT | Performed by: NURSE PRACTITIONER

## 2024-10-16 PROCEDURE — 1158F ADVNC CARE PLAN TLK DOCD: CPT | Performed by: NURSE PRACTITIONER

## 2024-10-16 ASSESSMENT — LIFESTYLE VARIABLES
HOW OFTEN DURING THE LAST YEAR HAVE YOU BEEN UNABLE TO REMEMBER WHAT HAPPENED THE NIGHT BEFORE BECAUSE YOU HAD BEEN DRINKING: NEVER
HOW OFTEN DO YOU HAVE A DRINK CONTAINING ALCOHOL: NEVER
HAS A RELATIVE, FRIEND, DOCTOR, OR ANOTHER HEALTH PROFESSIONAL EXPRESSED CONCERN ABOUT YOUR DRINKING OR SUGGESTED YOU CUT DOWN: NO
HOW OFTEN DURING THE LAST YEAR HAVE YOU FAILED TO DO WHAT WAS NORMALLY EXPECTED FROM YOU BECAUSE OF DRINKING: NEVER
HOW MANY STANDARD DRINKS CONTAINING ALCOHOL DO YOU HAVE ON A TYPICAL DAY: PATIENT DOES NOT DRINK
SKIP TO QUESTIONS 9-10: 1
HOW OFTEN DURING THE LAST YEAR HAVE YOU NEEDED AN ALCOHOLIC DRINK FIRST THING IN THE MORNING TO GET YOURSELF GOING AFTER A NIGHT OF HEAVY DRINKING: NEVER
HAVE YOU OR SOMEONE ELSE BEEN INJURED AS A RESULT OF YOUR DRINKING: NO
HOW OFTEN DO YOU HAVE SIX OR MORE DRINKS ON ONE OCCASION: NEVER
HOW OFTEN DURING THE LAST YEAR HAVE YOU FOUND THAT YOU WERE NOT ABLE TO STOP DRINKING ONCE YOU HAD STARTED: NEVER
HOW OFTEN DURING THE LAST YEAR HAVE YOU HAD A FEELING OF GUILT OR REMORSE AFTER DRINKING: NEVER
AUDIT TOTAL SCORE: 0
AUDIT-C TOTAL SCORE: 0

## 2024-10-16 ASSESSMENT — ENCOUNTER SYMPTOMS
HEMATURIA: 0
FATIGUE: 0
LOSS OF SENSATION IN FEET: 0
CHILLS: 0
DEPRESSION: 0
DYSURIA: 0
DIAPHORESIS: 0
VOMITING: 0
NAUSEA: 0
FLANK PAIN: 0
OCCASIONAL FEELINGS OF UNSTEADINESS: 0
FEVER: 0
FREQUENCY: 1

## 2024-10-16 ASSESSMENT — PAIN SCALES - GENERAL: PAINLEVEL_OUTOF10: 0-NO PAIN

## 2024-10-16 NOTE — PROGRESS NOTES
[facility]: [facility]  PROGRESS NOTE      Gauri Sharpe is a 74 y.o. female that is presenting today for follow up (Follow up on bladder issues - prescription given at last appointment, blood work).    Ms. Sharpe was seen by Murtaza Cha CNP on 09/23/24 for increased thirst and urinary frequency and urgency. She was evaluated for UTI and DM. Last A1c 6.2 6 months previous and 6.1 on day of visit. urine was sent for culture and demonstrated UTI with >100,000 cols kleb pneu. She was started on abx.  Murtaza was planning to start Ditropan for OAB at her OV.  Patient reports she did not start the Ditropan.    She reports he completed full course of abx as directed with little change in symptoms. She reports continued urinary frequency - every 15 minutes - small amounts. Nocturia x3.    Assessment/Plan     Diagnoses and all orders for this visit:    Urinary frequency  -     Concern for urinary retention vs OAB. Obtain bladder US to verfiy.  IF OAB, will initiate oxybutynin trial. If urinary retention, refer to urology. Recheck urine to assure resolution of UTI.  -     US bladder; Future  -     Urinalysis with Reflex Culture and Microscopic; Future    Subjective   HPI  Review of Systems   Constitutional:  Negative for chills, diaphoresis, fatigue and fever.   Gastrointestinal:  Negative for nausea and vomiting.   Genitourinary:  Positive for frequency and urgency. Negative for dysuria, flank pain, hematuria and pelvic pain.      Objective   Vitals:    10/16/24 0920   BP: 122/80   Pulse: 68   Temp: 35.4 °C (95.7 °F)   SpO2: 98%      Body mass index is 38.04 kg/m².  Physical Exam  Vitals and nursing note reviewed.   Constitutional:       General: She is not in acute distress.  Cardiovascular:      Rate and Rhythm: Normal rate and regular rhythm.      Heart sounds: Normal heart sounds.   Pulmonary:      Effort: Pulmonary effort is normal.      Breath sounds: Normal breath sounds.   Abdominal:  "     General: There is no distension.      Palpations: Abdomen is soft. There is no mass.      Tenderness: There is no abdominal tenderness. There is no right CVA tenderness or left CVA tenderness.   Skin:     General: Skin is warm and dry.   Neurological:      Mental Status: She is alert. Mental status is at baseline.   Psychiatric:         Mood and Affect: Mood normal.       Diagnostic Results   Lab Results   Component Value Date    GLUCOSE 97 04/09/2024    CALCIUM 10.1 04/09/2024     04/09/2024    K 4.7 04/09/2024    CO2 25 04/09/2024     04/09/2024    BUN 18 04/09/2024    CREATININE 0.90 04/09/2024     Lab Results   Component Value Date    ALT 9 04/09/2024    AST 17 04/09/2024    ALKPHOS 110 04/09/2024    BILITOT 0.5 04/09/2024     Lab Results   Component Value Date    WBC 10.7 04/09/2024    HGB 13.0 04/09/2024    HCT 42.9 04/09/2024    MCV 89 04/09/2024     04/09/2024     Lab Results   Component Value Date    CHOL 236 (H) 04/09/2024    CHOL 213 (H) 04/04/2023    CHOL 207 (H) 03/31/2022     Lab Results   Component Value Date    HDL 47.0 (L) 04/09/2024    HDL 43 (L) 04/04/2023    HDL 45 (L) 03/31/2022     Lab Results   Component Value Date    LDLCALC 162 (H) 04/09/2024    LDLCALC 142 (H) 04/04/2023    LDLCALC 136 (H) 03/31/2022     Lab Results   Component Value Date    TRIG 137 04/09/2024    TRIG 139 04/04/2023    TRIG 129 03/31/2022     No components found for: \"CHOLHDL\"  Lab Results   Component Value Date    HGBA1C 6.1 (H) 09/23/2024     Other labs not included in the list above were reviewed either before or during this encounter.    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) 04/09/2024    Personal history of other diseases of the digestive system 07/18/2014    History of irritable bowel syndrome    Personal history of other diseases of the nervous system and sense organs     History of Guillain-[place] syndrome    Personal history of other diseases of the nervous system and sense organs     History of migraine    Personal history of other endocrine, nutritional and metabolic disease     History of hypothyroidism    Personal history of other specified conditions     History of pituitary tumor    Unspecified contact dermatitis, unspecified cause 10/04/2024    Unspecified fall, initial encounter 03/28/2016    Fall at home    Unspecified multiple injuries, initial encounter 03/28/2016    Multiple contusions     Past Surgical History:   Procedure Laterality Date    CHOLECYSTECTOMY  03/24/2014    Cholecystectomy    COLONOSCOPY  03/24/2014    Complete Colonoscopy    OTHER SURGICAL HISTORY  03/24/2014    Neuroendosc Dissect Adhesion Excise Pituitary Tumor    OTHER SURGICAL HISTORY  03/31/2015    Gastric Surgery For Morbid Obesity Laparoscopic Longitudinal Gastrectomy    TONSILLECTOMY  03/24/2014    Tonsillectomy    TOTAL HIP ARTHROPLASTY Left 11/29/2023    TOTAL KNEE ARTHROPLASTY  03/31/2015    Knee Replacement     Family History   Problem Relation Name Age of Onset    Kidney disease Mother      Stroke Mother      Diabetes Mother      Alzheimer's disease Father      Diabetes Father      Alzheimer's disease Sister      Diabetes Sister      Diabetes Brother      Diabetes Brother      Down syndrome Brother      Alzheimer's disease Brother       Social History     Socioeconomic History    Marital status: Single     Spouse name: Not on file    Number of  "children: Not on file    Years of education: Not on file    Highest education level: Not on file   Occupational History    Not on file   Tobacco Use    Smoking status: Never    Smokeless tobacco: Never   Vaping Use    Vaping status: Never Used   Substance and Sexual Activity    Alcohol use: Not Currently    Drug use: Never    Sexual activity: Not on file   Other Topics Concern    Not on file   Social History Narrative    Not on file     Social Drivers of Health     Financial Resource Strain: Not on file   Food Insecurity: Not on file   Transportation Needs: Not on file   Physical Activity: Not on file   Stress: Not on file   Social Connections: Not on file   Intimate Partner Violence: Not on file   Housing Stability: Not on file     Allergies   Allergen Reactions    Influenza Virus Vaccines Other     \"Guillain-[place] syndrome    Adhesive Tape-Silicones Rash     RASH TO SKIN     Current Outpatient Medications on File Prior to Visit   Medication Sig Dispense Refill    levothyroxine (Synthroid, Levoxyl) 125 mcg tablet Take 1 tablet (125 mcg) by mouth once daily in the morning. Take before meals. 90 tablet 1    omeprazole (PriLOSEC) 20 mg DR capsule 1 capsule 30 minutes before morning meal Orally Once a day for 30 day(s)      [DISCONTINUED] betamethasone dipropionate 0.05 % cream 0 cream       No current facility-administered medications on file prior to visit.     Immunization History   Administered Date(s) Administered    Influenza, seasonal, injectable 10/28/2017    Pneumococcal polysaccharide vaccine, 23-valent, age 2 years and older (PNEUMOVAX 23) 03/28/2015    Td (adult) 08/01/1999     Patient's medical history was reviewed and updated either before or during this encounter.       Danelle Giordano, APRN-CNP  "

## 2024-10-21 ENCOUNTER — HOSPITAL ENCOUNTER (OUTPATIENT)
Dept: RADIOLOGY | Facility: CLINIC | Age: 74
Discharge: HOME | End: 2024-10-21
Payer: MEDICARE

## 2024-10-21 DIAGNOSIS — R35.0 URINARY FREQUENCY: ICD-10-CM

## 2024-10-21 PROCEDURE — 76857 US EXAM PELVIC LIMITED: CPT

## 2024-10-21 PROCEDURE — 76857 US EXAM PELVIC LIMITED: CPT | Performed by: RADIOLOGY

## 2024-10-25 ENCOUNTER — TELEPHONE (OUTPATIENT)
Dept: PRIMARY CARE | Facility: CLINIC | Age: 74
End: 2024-10-25
Payer: MEDICARE

## 2024-10-28 DIAGNOSIS — N32.81 OAB (OVERACTIVE BLADDER): Primary | ICD-10-CM

## 2024-10-28 RX ORDER — OXYBUTYNIN CHLORIDE 5 MG/1
5 TABLET, EXTENDED RELEASE ORAL DAILY
Qty: 30 TABLET | Refills: 2 | Status: SHIPPED | OUTPATIENT
Start: 2024-10-28

## 2024-12-14 ENCOUNTER — ANCILLARY PROCEDURE (OUTPATIENT)
Dept: URGENT CARE | Age: 74
End: 2024-12-14
Payer: MEDICARE

## 2024-12-14 ENCOUNTER — OFFICE VISIT (OUTPATIENT)
Dept: URGENT CARE | Age: 74
End: 2024-12-14
Payer: MEDICARE

## 2024-12-14 VITALS
DIASTOLIC BLOOD PRESSURE: 82 MMHG | RESPIRATION RATE: 18 BRPM | HEART RATE: 69 BPM | BODY MASS INDEX: 36.58 KG/M2 | WEIGHT: 200 LBS | OXYGEN SATURATION: 95 % | TEMPERATURE: 97.5 F | SYSTOLIC BLOOD PRESSURE: 149 MMHG

## 2024-12-14 DIAGNOSIS — J20.8 ACUTE BACTERIAL BRONCHITIS: Primary | ICD-10-CM

## 2024-12-14 DIAGNOSIS — B96.89 ACUTE BACTERIAL BRONCHITIS: Primary | ICD-10-CM

## 2024-12-14 DIAGNOSIS — R05.1 ACUTE COUGH: ICD-10-CM

## 2024-12-14 PROCEDURE — 71046 X-RAY EXAM CHEST 2 VIEWS: CPT | Performed by: SURGERY

## 2024-12-14 RX ORDER — AZITHROMYCIN 250 MG/1
TABLET, FILM COATED ORAL
Qty: 6 TABLET | Refills: 0 | Status: SHIPPED | OUTPATIENT
Start: 2024-12-14 | End: 2024-12-19

## 2024-12-14 RX ORDER — BENZONATATE 200 MG/1
200 CAPSULE ORAL 3 TIMES DAILY PRN
Qty: 42 CAPSULE | Refills: 0 | Status: SHIPPED | OUTPATIENT
Start: 2024-12-14 | End: 2025-01-13

## 2024-12-14 RX ORDER — PREDNISONE 20 MG/1
40 TABLET ORAL DAILY
Qty: 10 TABLET | Refills: 0 | Status: SHIPPED | OUTPATIENT
Start: 2024-12-14 | End: 2024-12-19

## 2024-12-14 RX ORDER — AMOXICILLIN AND CLAVULANATE POTASSIUM 875; 125 MG/1; MG/1
1 TABLET, FILM COATED ORAL 2 TIMES DAILY
Qty: 14 TABLET | Refills: 0 | Status: SHIPPED | OUTPATIENT
Start: 2024-12-14 | End: 2024-12-21

## 2024-12-14 NOTE — PROGRESS NOTES
Chief Complaint   Patient presents with    Nasal Congestion    Cough    Headache     OVER A WEEK , HAS A 9 YEAR OLD NEPHEW LIVING WITH HER  COUGH  WHEEZING         Physical Exam:     GEN: No acute distress    ENT: Bilateral TMs and canals unremarkable, sinus congestion present. Pharynx and tonsils mildly hyperemic but without exudate.     Resp: Lungs clear to auscultation bilaterally     Imaging:       === 12/14/24 ===    XR CHEST 2 VIEWS    - Impression -  1.  Elevated right hemidiaphragm similar to prior with mild bibasilar  atelectasis. No focal consolidation.        MACRO:  None.    Signed by: Robe Moreno 12/14/2024 10:34 AM  Dictation workstation:   [id number]    Assessment:     Encounter Diagnosis   Name Primary?    Acute bacterial bronchitis Yes          Medical Decision Making & Plan:     Azithromycin + Augmentin   Prednisone  Tessalon        12/14/24 at 10:50 AM - Torri Humphreys DO

## 2025-01-21 ENCOUNTER — OFFICE VISIT (OUTPATIENT)
Dept: URGENT CARE | Age: 75
End: 2025-01-21
Payer: MEDICARE

## 2025-01-21 VITALS
WEIGHT: 195 LBS | HEART RATE: 74 BPM | BODY MASS INDEX: 31.34 KG/M2 | OXYGEN SATURATION: 97 % | SYSTOLIC BLOOD PRESSURE: 152 MMHG | DIASTOLIC BLOOD PRESSURE: 80 MMHG | RESPIRATION RATE: 18 BRPM | HEIGHT: 66 IN | TEMPERATURE: 97.4 F

## 2025-01-21 DIAGNOSIS — N30.91 HEMORRHAGIC CYSTITIS: ICD-10-CM

## 2025-01-21 DIAGNOSIS — R30.0 DYSURIA: Primary | ICD-10-CM

## 2025-01-21 LAB
POC APPEARANCE, URINE: CLEAR
POC BILIRUBIN, URINE: NEGATIVE
POC BLOOD, URINE: ABNORMAL
POC COLOR, URINE: ABNORMAL
POC GLUCOSE, URINE: NEGATIVE MG/DL
POC KETONES, URINE: NEGATIVE MG/DL
POC LEUKOCYTES, URINE: ABNORMAL
POC NITRITE,URINE: NEGATIVE
POC PH, URINE: 6 PH
POC PROTEIN, URINE: NEGATIVE MG/DL
POC SPECIFIC GRAVITY, URINE: 1.02
POC UROBILINOGEN, URINE: 0.2 EU/DL

## 2025-01-21 PROCEDURE — 87086 URINE CULTURE/COLONY COUNT: CPT

## 2025-01-21 PROCEDURE — 87186 SC STD MICRODIL/AGAR DIL: CPT

## 2025-01-21 RX ORDER — NITROFURANTOIN 25; 75 MG/1; MG/1
100 CAPSULE ORAL 2 TIMES DAILY
Qty: 14 CAPSULE | Refills: 0 | Status: SHIPPED | OUTPATIENT
Start: 2025-01-21 | End: 2025-01-28

## 2025-01-21 ASSESSMENT — ENCOUNTER SYMPTOMS
FREQUENCY: 1
DYSURIA: 1

## 2025-01-21 NOTE — PROGRESS NOTES
Subjective   Patient ID: Gauri Sharpe is a 74 y.o. female. They present today with a chief complaint of Difficulty Urinating (Pt c/o urgency, decreased output x 3 days.).    History of Present Illness    Difficulty Urinating    This is 74-year-old female presents today complaining of dysuria with associated urgency for the past 3 days.  She denies any fever or chills.  She denies any abdominal pain.  She denies any flank pain.  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    Asymptomatic menopausal state     Menopause    Depressive disorder 12/19/2008    Eczema 10/22/2014    Elevated glucose 09/09/2023    Gastric ulcer 04/09/2024    1972 1972    Hemorrhoids 09/09/2023    Hyperlipidemia, unspecified 08/30/2020    Hyperlipidemia    Lateral epicondylitis 06/27/2010    Mucoid cyst, joint 01/17/2012    Pain in limb 03/03/2009    Pancreatitis ([facility]) 04/09/2024    Personal history of other diseases of the digestive system 07/18/2014    History of irritable bowel syndrome    Personal history of other diseases of the nervous system and sense organs     History of Guillain-[place] syndrome    Personal history of other diseases of the nervous system and sense organs     History of migraine    Personal history of other endocrine, nutritional and metabolic disease     History of hypothyroidism    Personal history of other specified conditions     History of pituitary tumor    Unspecified contact dermatitis, unspecified cause 10/04/2024    Unspecified fall, initial encounter 03/28/2016    Fall at home    Unspecified multiple injuries,  "initial encounter 03/28/2016    Multiple contusions       Past Surgical History:   Procedure Laterality Date    CHOLECYSTECTOMY  03/24/2014    Cholecystectomy    COLONOSCOPY  03/24/2014    Complete Colonoscopy    OTHER SURGICAL HISTORY  03/24/2014    Neuroendosc Dissect Adhesion Excise Pituitary Tumor    OTHER SURGICAL HISTORY  03/31/2015    Gastric Surgery For Morbid Obesity Laparoscopic Longitudinal Gastrectomy    TONSILLECTOMY  03/24/2014    Tonsillectomy    TOTAL HIP ARTHROPLASTY Left 11/29/2023    TOTAL KNEE ARTHROPLASTY  03/31/2015    Knee Replacement        reports that she has never smoked. She has never used smokeless tobacco. She reports that she does not currently use alcohol. She reports that she does not use drugs.    Review of Systems  Review of Systems   Genitourinary:  Positive for dysuria and frequency.   All other systems reviewed and are negative.                                 Objective    Vitals:    01/21/25 1405   BP: 152/80   BP Location: Left arm   Patient Position: Sitting   BP Cuff Size: Adult   Pulse: 74   Resp: 18   Temp: 36.3 °C (97.4 °F)   TempSrc: Oral   SpO2: 97%   Weight: 88.5 kg (195 lb)   Height: 1.676 m (5' 6\")     No LMP recorded.    Physical Exam  Patient is awake alert oriented x 3 in no acute distress vital signs are stable.  She is afebrile.  Abdominal examination reveals no abdominal tenderness no guarding no rigidity no rebound.  No flank tenderness.  No suprapubic tenderness.  Procedures    Point of Care Test & Imaging Results from this visit  Results for orders placed or performed in visit on 01/21/25   POCT UA Automated manually resulted   Result Value Ref Range    POC Color, Urine Light-Yellow Straw, Yellow, Light-Yellow    POC Appearance, Urine Clear Clear    POC Glucose, Urine NEGATIVE NEGATIVE mg/dl    POC Bilirubin, Urine NEGATIVE NEGATIVE    POC Ketones, Urine NEGATIVE NEGATIVE mg/dl    POC Specific Gravity, Urine 1.025 1.005 - 1.035    POC Blood, Urine MODERATE " (2+) (A) NEGATIVE    POC PH, Urine 6.0 No Reference Range Established PH    POC Protein, Urine NEGATIVE NEGATIVE mg/dl    POC Urobilinogen, Urine 0.2 0.2, 1.0 EU/DL    Poc Nitrite, Urine NEGATIVE NEGATIVE    POC Leukocytes, Urine MODERATE (2+) (A) NEGATIVE      No results found.    Diagnostic study results (if any) were reviewed by Ritesh Ross DO.    Assessment/Plan   Allergies, medications, history, and pertinent labs/EKGs/Imaging reviewed by Ritesh Ross DO.     Medical Decision Making      Orders and Diagnoses  Diagnoses and all orders for this visit:  Dysuria  -     POCT UA Automated manually resulted      Medical Admin Record      Patient disposition: Home    Electronically signed by Ritesh Ross DO  2:28 PM

## 2025-01-24 LAB — BACTERIA UR CULT: ABNORMAL

## 2025-03-04 ENCOUNTER — APPOINTMENT (OUTPATIENT)
Dept: PRIMARY CARE | Facility: CLINIC | Age: 75
End: 2025-03-04
Payer: MEDICARE

## 2025-03-04 ENCOUNTER — TELEPHONE (OUTPATIENT)
Dept: PRIMARY CARE | Facility: CLINIC | Age: 75
End: 2025-03-04

## 2025-04-10 ENCOUNTER — APPOINTMENT (OUTPATIENT)
Dept: PRIMARY CARE | Facility: CLINIC | Age: 75
End: 2025-04-10
Payer: MEDICARE

## 2025-04-12 DIAGNOSIS — E03.9 ACQUIRED HYPOTHYROIDISM: ICD-10-CM

## 2025-04-14 RX ORDER — LEVOTHYROXINE SODIUM 125 UG/1
125 TABLET ORAL
Qty: 30 TABLET | Refills: 1 | Status: SHIPPED | OUTPATIENT
Start: 2025-04-14

## 2025-04-14 NOTE — TELEPHONE ENCOUNTER
LV 10/16/24, no NV scheduled has new primary, not established with her yet, no future appt with any PCP but phone call to the new PCP group.

## 2025-04-16 ENCOUNTER — OFFICE VISIT (OUTPATIENT)
Dept: URGENT CARE | Age: 75
End: 2025-04-16
Payer: MEDICARE

## 2025-04-16 VITALS
HEIGHT: 66 IN | TEMPERATURE: 97.7 F | WEIGHT: 200 LBS | RESPIRATION RATE: 16 BRPM | BODY MASS INDEX: 32.14 KG/M2 | SYSTOLIC BLOOD PRESSURE: 128 MMHG | DIASTOLIC BLOOD PRESSURE: 80 MMHG | OXYGEN SATURATION: 97 % | HEART RATE: 79 BPM

## 2025-04-16 DIAGNOSIS — H66.001 ACUTE SUPPURATIVE OTITIS MEDIA OF RIGHT EAR WITHOUT SPONTANEOUS RUPTURE OF TYMPANIC MEMBRANE, RECURRENCE NOT SPECIFIED: Primary | ICD-10-CM

## 2025-04-16 ASSESSMENT — PAIN SCALES - GENERAL: PAINLEVEL_OUTOF10: 0-NO PAIN

## 2025-04-16 ASSESSMENT — ENCOUNTER SYMPTOMS: SINUS COMPLAINT: 1
